# Patient Record
Sex: FEMALE | Race: WHITE | NOT HISPANIC OR LATINO | Employment: OTHER | URBAN - METROPOLITAN AREA
[De-identification: names, ages, dates, MRNs, and addresses within clinical notes are randomized per-mention and may not be internally consistent; named-entity substitution may affect disease eponyms.]

---

## 2017-06-12 ENCOUNTER — RECORDS - HEALTHEAST (OUTPATIENT)
Dept: LAB | Facility: CLINIC | Age: 77
End: 2017-06-12

## 2017-06-12 LAB
CHOLEST SERPL-MCNC: 147 MG/DL
FASTING STATUS PATIENT QL REPORTED: ABNORMAL
HDLC SERPL-MCNC: 39 MG/DL
LDLC SERPL CALC-MCNC: 72 MG/DL
TRIGL SERPL-MCNC: 178 MG/DL

## 2017-10-23 ENCOUNTER — RECORDS - HEALTHEAST (OUTPATIENT)
Dept: ADMINISTRATIVE | Facility: OTHER | Age: 77
End: 2017-10-23

## 2017-10-30 ENCOUNTER — RECORDS - HEALTHEAST (OUTPATIENT)
Dept: BONE DENSITY | Facility: CLINIC | Age: 77
End: 2017-10-30

## 2017-10-30 ENCOUNTER — RECORDS - HEALTHEAST (OUTPATIENT)
Dept: ADMINISTRATIVE | Facility: OTHER | Age: 77
End: 2017-10-30

## 2017-10-30 DIAGNOSIS — Z78.0 ASYMPTOMATIC MENOPAUSAL STATE: ICD-10-CM

## 2017-10-30 DIAGNOSIS — M85.80 OTHER SPECIFIED DISORDERS OF BONE DENSITY AND STRUCTURE, UNSPECIFIED SITE: ICD-10-CM

## 2018-06-11 ENCOUNTER — RECORDS - HEALTHEAST (OUTPATIENT)
Dept: LAB | Facility: CLINIC | Age: 78
End: 2018-06-11

## 2018-06-11 ENCOUNTER — RECORDS - HEALTHEAST (OUTPATIENT)
Dept: ADMINISTRATIVE | Facility: OTHER | Age: 78
End: 2018-06-11

## 2018-06-11 LAB
ANION GAP SERPL CALCULATED.3IONS-SCNC: 14 MMOL/L (ref 5–18)
BUN SERPL-MCNC: 18 MG/DL (ref 8–28)
CALCIUM SERPL-MCNC: 9.8 MG/DL (ref 8.5–10.5)
CHLORIDE BLD-SCNC: 103 MMOL/L (ref 98–107)
CHOLEST SERPL-MCNC: 156 MG/DL
CO2 SERPL-SCNC: 22 MMOL/L (ref 22–31)
CREAT SERPL-MCNC: 0.74 MG/DL (ref 0.6–1.1)
FASTING STATUS PATIENT QL REPORTED: YES
GFR SERPL CREATININE-BSD FRML MDRD: >60 ML/MIN/1.73M2
GLUCOSE BLD-MCNC: 88 MG/DL (ref 70–125)
HDLC SERPL-MCNC: 40 MG/DL
LDLC SERPL CALC-MCNC: 79 MG/DL
POTASSIUM BLD-SCNC: 4 MMOL/L (ref 3.5–5)
SODIUM SERPL-SCNC: 139 MMOL/L (ref 136–145)
TRIGL SERPL-MCNC: 184 MG/DL
VIT B12 SERPL-MCNC: 521 PG/ML (ref 213–816)

## 2018-06-12 LAB — 25(OH)D3 SERPL-MCNC: 30.4 NG/ML (ref 30–80)

## 2018-06-15 ENCOUNTER — RECORDS - HEALTHEAST (OUTPATIENT)
Dept: MAMMOGRAPHY | Facility: CLINIC | Age: 78
End: 2018-06-15

## 2018-06-15 DIAGNOSIS — Z12.31 ENCOUNTER FOR SCREENING MAMMOGRAM FOR MALIGNANT NEOPLASM OF BREAST: ICD-10-CM

## 2019-06-17 ENCOUNTER — RECORDS - HEALTHEAST (OUTPATIENT)
Dept: LAB | Facility: CLINIC | Age: 79
End: 2019-06-17

## 2019-06-17 LAB
ANION GAP SERPL CALCULATED.3IONS-SCNC: 12 MMOL/L (ref 5–18)
BUN SERPL-MCNC: 14 MG/DL (ref 8–28)
CALCIUM SERPL-MCNC: 10.1 MG/DL (ref 8.5–10.5)
CHLORIDE BLD-SCNC: 101 MMOL/L (ref 98–107)
CHOLEST SERPL-MCNC: 156 MG/DL
CO2 SERPL-SCNC: 25 MMOL/L (ref 22–31)
CREAT SERPL-MCNC: 0.78 MG/DL (ref 0.6–1.1)
FASTING STATUS PATIENT QL REPORTED: YES
GFR SERPL CREATININE-BSD FRML MDRD: >60 ML/MIN/1.73M2
GLUCOSE BLD-MCNC: 92 MG/DL (ref 70–125)
HDLC SERPL-MCNC: 42 MG/DL
LDLC SERPL CALC-MCNC: 65 MG/DL
POTASSIUM BLD-SCNC: 4.2 MMOL/L (ref 3.5–5)
SODIUM SERPL-SCNC: 138 MMOL/L (ref 136–145)
TRIGL SERPL-MCNC: 245 MG/DL
VIT B12 SERPL-MCNC: 490 PG/ML (ref 213–816)

## 2019-06-18 ENCOUNTER — AMBULATORY - HEALTHEAST (OUTPATIENT)
Dept: ADMINISTRATIVE | Facility: REHABILITATION | Age: 79
End: 2019-06-18

## 2019-06-18 DIAGNOSIS — R26.89 BALANCE PROBLEMS: ICD-10-CM

## 2019-07-03 ENCOUNTER — OFFICE VISIT - HEALTHEAST (OUTPATIENT)
Dept: PHYSICAL THERAPY | Facility: REHABILITATION | Age: 79
End: 2019-07-03

## 2019-07-03 DIAGNOSIS — R26.81 UNSTEADINESS ON FEET: ICD-10-CM

## 2019-07-09 ENCOUNTER — OFFICE VISIT - HEALTHEAST (OUTPATIENT)
Dept: PHYSICAL THERAPY | Facility: REHABILITATION | Age: 79
End: 2019-07-09

## 2019-07-09 DIAGNOSIS — R26.81 UNSTEADINESS ON FEET: ICD-10-CM

## 2019-07-11 ENCOUNTER — OFFICE VISIT - HEALTHEAST (OUTPATIENT)
Dept: PHYSICAL THERAPY | Facility: REHABILITATION | Age: 79
End: 2019-07-11

## 2019-07-11 DIAGNOSIS — R26.81 UNSTEADINESS ON FEET: ICD-10-CM

## 2019-07-16 ENCOUNTER — OFFICE VISIT - HEALTHEAST (OUTPATIENT)
Dept: PHYSICAL THERAPY | Facility: REHABILITATION | Age: 79
End: 2019-07-16

## 2019-07-16 DIAGNOSIS — R26.81 UNSTEADINESS ON FEET: ICD-10-CM

## 2019-07-18 ENCOUNTER — OFFICE VISIT - HEALTHEAST (OUTPATIENT)
Dept: PHYSICAL THERAPY | Facility: REHABILITATION | Age: 79
End: 2019-07-18

## 2019-07-18 DIAGNOSIS — R26.81 UNSTEADINESS ON FEET: ICD-10-CM

## 2019-07-23 ENCOUNTER — OFFICE VISIT - HEALTHEAST (OUTPATIENT)
Dept: PHYSICAL THERAPY | Facility: REHABILITATION | Age: 79
End: 2019-07-23

## 2019-07-23 DIAGNOSIS — R26.81 UNSTEADINESS ON FEET: ICD-10-CM

## 2019-07-25 ENCOUNTER — OFFICE VISIT - HEALTHEAST (OUTPATIENT)
Dept: PHYSICAL THERAPY | Facility: REHABILITATION | Age: 79
End: 2019-07-25

## 2019-07-25 DIAGNOSIS — R26.81 UNSTEADINESS ON FEET: ICD-10-CM

## 2019-07-30 ENCOUNTER — OFFICE VISIT - HEALTHEAST (OUTPATIENT)
Dept: PHYSICAL THERAPY | Facility: REHABILITATION | Age: 79
End: 2019-07-30

## 2019-07-30 DIAGNOSIS — R26.81 UNSTEADINESS ON FEET: ICD-10-CM

## 2019-08-01 ENCOUNTER — OFFICE VISIT - HEALTHEAST (OUTPATIENT)
Dept: PHYSICAL THERAPY | Facility: REHABILITATION | Age: 79
End: 2019-08-01

## 2019-08-01 DIAGNOSIS — R26.81 UNSTEADINESS ON FEET: ICD-10-CM

## 2019-08-06 ENCOUNTER — OFFICE VISIT - HEALTHEAST (OUTPATIENT)
Dept: PHYSICAL THERAPY | Facility: REHABILITATION | Age: 79
End: 2019-08-06

## 2019-08-06 DIAGNOSIS — R26.81 UNSTEADINESS ON FEET: ICD-10-CM

## 2019-08-08 ENCOUNTER — OFFICE VISIT - HEALTHEAST (OUTPATIENT)
Dept: PHYSICAL THERAPY | Facility: REHABILITATION | Age: 79
End: 2019-08-08

## 2019-08-08 DIAGNOSIS — R26.81 UNSTEADINESS ON FEET: ICD-10-CM

## 2019-08-13 ENCOUNTER — OFFICE VISIT - HEALTHEAST (OUTPATIENT)
Dept: PHYSICAL THERAPY | Facility: REHABILITATION | Age: 79
End: 2019-08-13

## 2019-08-13 DIAGNOSIS — R26.81 UNSTEADINESS ON FEET: ICD-10-CM

## 2020-09-17 ENCOUNTER — RECORDS - HEALTHEAST (OUTPATIENT)
Dept: LAB | Facility: CLINIC | Age: 80
End: 2020-09-17

## 2020-09-17 LAB
ANION GAP SERPL CALCULATED.3IONS-SCNC: 11 MMOL/L (ref 5–18)
BUN SERPL-MCNC: 14 MG/DL (ref 8–28)
CALCIUM SERPL-MCNC: 9.5 MG/DL (ref 8.5–10.5)
CHLORIDE BLD-SCNC: 100 MMOL/L (ref 98–107)
CHOLEST SERPL-MCNC: 169 MG/DL
CO2 SERPL-SCNC: 26 MMOL/L (ref 22–31)
CREAT SERPL-MCNC: 0.84 MG/DL (ref 0.6–1.1)
FASTING STATUS PATIENT QL REPORTED: YES
GFR SERPL CREATININE-BSD FRML MDRD: >60 ML/MIN/1.73M2
GLUCOSE BLD-MCNC: 96 MG/DL (ref 70–125)
HDLC SERPL-MCNC: 42 MG/DL
LDLC SERPL CALC-MCNC: 74 MG/DL
POTASSIUM BLD-SCNC: 4.5 MMOL/L (ref 3.5–5)
SODIUM SERPL-SCNC: 137 MMOL/L (ref 136–145)
TRIGL SERPL-MCNC: 265 MG/DL

## 2020-11-16 ENCOUNTER — RECORDS - HEALTHEAST (OUTPATIENT)
Dept: LAB | Facility: CLINIC | Age: 80
End: 2020-11-16

## 2020-11-16 LAB
ALBUMIN SERPL-MCNC: 3.8 G/DL (ref 3.5–5)
ALP SERPL-CCNC: 66 U/L (ref 45–120)
ALT SERPL W P-5'-P-CCNC: 19 U/L (ref 0–45)
ANION GAP SERPL CALCULATED.3IONS-SCNC: 10 MMOL/L (ref 5–18)
AST SERPL W P-5'-P-CCNC: 21 U/L (ref 0–40)
BILIRUB SERPL-MCNC: 0.4 MG/DL (ref 0–1)
BUN SERPL-MCNC: 21 MG/DL (ref 8–28)
CALCIUM SERPL-MCNC: 9.7 MG/DL (ref 8.5–10.5)
CHLORIDE BLD-SCNC: 99 MMOL/L (ref 98–107)
CO2 SERPL-SCNC: 30 MMOL/L (ref 22–31)
CREAT SERPL-MCNC: 0.96 MG/DL (ref 0.6–1.1)
GFR SERPL CREATININE-BSD FRML MDRD: 56 ML/MIN/1.73M2
GLUCOSE BLD-MCNC: 103 MG/DL (ref 70–125)
POTASSIUM BLD-SCNC: 4.2 MMOL/L (ref 3.5–5)
PROT SERPL-MCNC: 7.3 G/DL (ref 6–8)
SODIUM SERPL-SCNC: 139 MMOL/L (ref 136–145)
TSH SERPL DL<=0.005 MIU/L-ACNC: 1.48 UIU/ML (ref 0.3–5)

## 2021-01-22 ENCOUNTER — RECORDS - HEALTHEAST (OUTPATIENT)
Dept: ADMINISTRATIVE | Facility: OTHER | Age: 81
End: 2021-01-22

## 2021-01-22 ENCOUNTER — RECORDS - HEALTHEAST (OUTPATIENT)
Dept: LAB | Facility: CLINIC | Age: 81
End: 2021-01-22

## 2021-01-22 LAB — ERYTHROCYTE [SEDIMENTATION RATE] IN BLOOD BY WESTERGREN METHOD: 26 MM/HR (ref 0–20)

## 2021-01-23 LAB
BASOPHILS # BLD AUTO: 0.1 THOU/UL (ref 0–0.2)
BASOPHILS NFR BLD AUTO: 1 % (ref 0–2)
EOSINOPHIL # BLD AUTO: 0.1 THOU/UL (ref 0–0.4)
EOSINOPHIL NFR BLD AUTO: 1 % (ref 0–6)
ERYTHROCYTE [DISTWIDTH] IN BLOOD BY AUTOMATED COUNT: 13.4 % (ref 11–14.5)
HCT VFR BLD AUTO: 36.6 % (ref 35–47)
HGB BLD-MCNC: 11.9 G/DL (ref 12–16)
IMM GRANULOCYTES # BLD: 0 THOU/UL
IMM GRANULOCYTES NFR BLD: 0 %
LYMPHOCYTES # BLD AUTO: 3.4 THOU/UL (ref 0.8–4.4)
LYMPHOCYTES NFR BLD AUTO: 27 % (ref 20–40)
MCH RBC QN AUTO: 28.8 PG (ref 27–34)
MCHC RBC AUTO-ENTMCNC: 32.5 G/DL (ref 32–36)
MCV RBC AUTO: 89 FL (ref 80–100)
MONOCYTES # BLD AUTO: 0.8 THOU/UL (ref 0–0.9)
MONOCYTES NFR BLD AUTO: 6 % (ref 2–10)
NEUTROPHILS # BLD AUTO: 8.3 THOU/UL (ref 2–7.7)
NEUTROPHILS NFR BLD AUTO: 66 % (ref 50–70)
PATH REPORT.MICROSCOPIC SPEC OTHER STN: ABNORMAL
PLATELET # BLD AUTO: 331 THOU/UL (ref 140–440)
PMV BLD AUTO: 9.9 FL (ref 8.5–12.5)
RBC # BLD AUTO: 4.13 MILL/UL (ref 3.8–5.4)
WBC: 12.6 THOU/UL (ref 4–11)

## 2021-01-25 LAB
ALBUMIN PERCENT: 57.8 % (ref 51–67)
ALBUMIN SERPL ELPH-MCNC: 4.1 G/DL (ref 3.2–4.7)
ALPHA 1 PERCENT: 3.1 % (ref 2–4)
ALPHA 2 PERCENT: 12.1 % (ref 5–13)
ALPHA1 GLOB SERPL ELPH-MCNC: 0.2 G/DL (ref 0.1–0.3)
ALPHA2 GLOB SERPL ELPH-MCNC: 0.9 G/DL (ref 0.4–0.9)
B-GLOBULIN SERPL ELPH-MCNC: 0.9 G/DL (ref 0.7–1.2)
BETA PERCENT: 13.1 % (ref 10–17)
GAMMA GLOB SERPL ELPH-MCNC: 1 G/DL (ref 0.6–1.4)
GAMMA GLOBULIN PERCENT: 13.9 % (ref 9–20)
LAB AP CHARGES (HE HISTORICAL CONVERSION): NORMAL
PATH ICD:: NORMAL
PATH REPORT.COMMENTS IMP SPEC: NORMAL
PATH REPORT.COMMENTS IMP SPEC: NORMAL
PATH REPORT.FINAL DX SPEC: NORMAL
PATH REPORT.MICROSCOPIC SPEC OTHER STN: NORMAL
PATH REPORT.RELEVANT HX SPEC: NORMAL
PROT PATTERN SERPL ELPH-IMP: NORMAL
PROT SERPL-MCNC: 7.1 G/DL (ref 6–8)
REVIEWING PATHOLOGIST: NORMAL

## 2021-01-29 ENCOUNTER — RECORDS - HEALTHEAST (OUTPATIENT)
Dept: LAB | Facility: CLINIC | Age: 81
End: 2021-01-29

## 2021-01-29 LAB
SARS-COV-2 PCR COMMENT: NORMAL
SARS-COV-2 RNA SPEC QL NAA+PROBE: NEGATIVE
SARS-COV-2 VIRUS SPECIMEN SOURCE: NORMAL

## 2021-05-25 ENCOUNTER — RECORDS - HEALTHEAST (OUTPATIENT)
Dept: ADMINISTRATIVE | Facility: CLINIC | Age: 81
End: 2021-05-25

## 2021-05-30 ENCOUNTER — RECORDS - HEALTHEAST (OUTPATIENT)
Dept: ADMINISTRATIVE | Facility: CLINIC | Age: 81
End: 2021-05-30

## 2021-05-30 NOTE — PROGRESS NOTES
[facility] Daily Progress     Patient Name: Gilma Zhong  Date: 7/16/2019  Visit #: 4/12 through 10/1  PTA visit #:    PRECAUTIONS: none  Referral Diagnosis: Balance problem (12 visits max)  Referring provider: Lily Thomas PA-C  Visit Diagnosis:     ICD-10-CM    1. Unsteadiness on feet R26.81          Assessment:     Although patient demonstrates relatively low risk for falls, she is appropriate to continue with skilled PT to reduce visual dependence/improve LE proprioception to help further reduce falls risk and improve ambulation tolerance.  HEP/POC compliance is  good .  Patient is appropriate to continue with skilled physical therapy intervention, as indicated by initial plan of care.   Pt with good tolerance for today's session. Requires several short seated rest breaks due to LE fatigue, but otherwise notes good level of difficulty with all balance ex.    Goal Status:  Pt. will be independent with home exercise program in : 6 weeks    Patient will increase : LEFS score;for improved quality of function;in 6 weeks;by _ points  by ___ points: >= 9  Pt will: demonstrate increased ambulation tolerance to >= 5 blocks without seated rest in 6 weeks.  Pt will: demonstrate improved balance: RODRIGUEZ >= 53/56 in 6 weeks.      Plan / Patient Education:     Continue per POC, progressing LE strength and static/dynamic standing balance.  Add SLS to HEP as able.    Subjective:     Feeling like she can stand/walk taller and even a little bit further already.  No falls since last visit.    Objective:     Varying levels of SBA/CGA required for static and dynamic standing balance ex, with occasional min A from therapist to maintain upright.  Attempted SLS, but patient with fairly significant difficulty, L LE more so than R.    Treatment Today     TREATMENT MINUTES COMMENTS   Evaluation     Self-care/ Home management     Manual therapy     Neuromuscular Re-education 39 Balance ex per flow sheet   Therapeutic  Activity     Therapeutic Exercises     Gait training     Modality__________________                Total 39    Blank areas are intentional and mean the treatment did not include these items.       Yariel Mello, PT, DPT  7/16/2019

## 2021-05-30 NOTE — PROGRESS NOTES
[facility] Daily Progress     Patient Name: Gilma Zhong  Date: 7/11/2019  Visit #: 312 through 10/1  PTA visit #:    PRECAUTIONS: none  Referral Diagnosis: Balance problem (12 visits max)  Referring provider: Lily Thomas PA-C  Visit Diagnosis:     ICD-10-CM    1. Unsteadiness on feet R26.81          Assessment:     Although patient demonstrates relatively low risk for falls, she is appropriate to continue with skilled PT to reduce visual dependence/improve LE proprioception to help further reduce falls risk and improve ambulation tolerance.  HEP/POC compliance is  good .  Patient is appropriate to continue with skilled physical therapy intervention, as indicated by initial plan of care.   Pt with good tolerance for today's session. Requires several short seated rest breaks due to LE fatigue, but otherwise notes good level of difficulty with all balance ex.    Goal Status:  Pt. will be independent with home exercise program in : 6 weeks    Patient will increase : LEFS score;for improved quality of function;in 6 weeks;by _ points  by ___ points: >= 9  Pt will: demonstrate increased ambulation tolerance to >= 5 blocks without seated rest in 6 weeks.  Pt will: demonstrate improved balance: RODRIGUEZ >= 53/56 in 6 weeks.      Plan / Patient Education:     Continue per POC, progressing LE strength and static/dynamic standing balance.    Subjective:     Feeling like she can stand/walk taller and even a little bit further already.  No falls since last visit.    Objective:     Able to progress 3/4 tandem to tandem stance today.  Varying levels of SBA/CGA required for static and dynamic standing balance ex, with occasional min A from therapist to maintain upright.    Treatment Today     TREATMENT MINUTES COMMENTS   Evaluation     Self-care/ Home management     Manual therapy     Neuromuscular Re-education 56 Balance ex per flow sheet   Therapeutic Activity     Therapeutic Exercises     Gait training      Modality__________________                Total 56    Blank areas are intentional and mean the treatment did not include these items.       Yariel Mello, PT, DPT  7/11/2019

## 2021-05-30 NOTE — PROGRESS NOTES
[facility] Daily Progress     Patient Name: Gilma Zhong  Date: 2019  Visit #:  through 10/1  PTA visit #:    PRECAUTIONS: none  Referral Diagnosis: Balance problem (12 visits max)  Referring provider: Lily Thomas PA-C  Visit Diagnosis:     ICD-10-CM    1. Unsteadiness on feet R26.81          Assessment:     Although patient demonstrates relatively low risk for falls, she is appropriate to continue with skilled PT to reduce visual dependence/improve LE proprioception to help further reduce falls risk and improve ambulation tolerance.  HEP/POC compliance is  good .  Patient is benefitting from skilled physical therapy and is making steady progress toward functional goals.  Patient is appropriate to continue with skilled physical therapy intervention, as indicated by initial plan of care.     Goal Status:  Pt. will be independent with home exercise program in : 6 weeks. MET  Patient will increase : LEFS score;for improved quality of function;in 6 weeks;by _ points  by ___ points: >= 9. MET  Pt will: demonstrate increased ambulation tolerance to >= 5 blocks without seated rest in 6 weeks. PROGRESSING  Pt will: demonstrate improved balance: RODRIGUEZ >= 53/56 in 6 weeks. MET      Plan / Patient Education:     Continue per POC, progressing LE strength and static/dynamic standing balance.  Add wall squats to HEP next visit.    Subjective:     No falls.  Continuing to do exercises regularly.  Notes her walking to be continuously getting better. Able to stand more upright and walk a straighter path. Able to tolerate approx. 3-4 blocks of walking currently.    Objective:     LEFS: 64/80 - significantly improved from 28/80 on IE.  RODRIGUEZ/56 - improved from 50/56 on IE.    Treatment Today     TREATMENT MINUTES COMMENTS   Evaluation     Self-care/ Home management     Manual therapy     Neuromuscular Re-education 23 Objective measures   Therapeutic Activity     Therapeutic Exercises 5 Stationary bike  level 4x 5 min   Gait training     Modality__________________                Total 28    Blank areas are intentional and mean the treatment did not include these items.       Yariel Mello, PT, DPT  7/30/2019

## 2021-05-30 NOTE — PROGRESS NOTES
[facility] Daily Progress     Patient Name: Gilma Zhong  Date: 7/23/2019  Visit #: 6/12 through 10/1  PTA visit #:    PRECAUTIONS: none  Referral Diagnosis: Balance problem (12 visits max)  Referring provider: Lily Thomas PA-C  Visit Diagnosis:     ICD-10-CM    1. Unsteadiness on feet R26.81          Assessment:     Although patient demonstrates relatively low risk for falls, she is appropriate to continue with skilled PT to reduce visual dependence/improve LE proprioception to help further reduce falls risk and improve ambulation tolerance.  HEP/POC compliance is  good .  Patient is benefitting from skilled physical therapy and is making steady progress toward functional goals.  Patient is appropriate to continue with skilled physical therapy intervention, as indicated by initial plan of care.     Goal Status:  Pt. will be independent with home exercise program in : 6 weeks    Patient will increase : LEFS score;for improved quality of function;in 6 weeks;by _ points  by ___ points: >= 9  Pt will: demonstrate increased ambulation tolerance to >= 5 blocks without seated rest in 6 weeks.  Pt will: demonstrate improved balance: RODRIGUEZ >= 53/56 in 6 weeks.      Plan / Patient Education:     Continue per POC, progressing LE strength and static/dynamic standing balance.  Reassess goals in 2 visits.    Subjective:     No falls.  Continuing to do exercises regularly.    Objective:     Pt able to demonstrate SLS approx. 2-3 sec bilat on R, x5 sec on L LE.  Cues provided throughout session for upright posture, as patient frequently forward bent at the hips.    Treatment Today     TREATMENT MINUTES COMMENTS   Evaluation     Self-care/ Home management     Manual therapy     Neuromuscular Re-education 29 Balance ex per flow sheet   Therapeutic Activity     Therapeutic Exercises     Gait training     Modality__________________                Total 29    Blank areas are intentional and mean the treatment did not  include these items.       Yariel Mello, PT, DPT  7/23/2019

## 2021-05-30 NOTE — PROGRESS NOTES
[facility] Daily Progress     Patient Name: Gilma Zhong  Date: 7/18/2019  Visit #: 5/12 through 10/1  PTA visit #:    PRECAUTIONS: none  Referral Diagnosis: Balance problem (12 visits max)  Referring provider: Lily Thomas PA-C  Visit Diagnosis:     ICD-10-CM    1. Unsteadiness on feet R26.81          Assessment:     Although patient demonstrates relatively low risk for falls, she is appropriate to continue with skilled PT to reduce visual dependence/improve LE proprioception to help further reduce falls risk and improve ambulation tolerance.  HEP/POC compliance is  good .  Patient is benefitting from skilled physical therapy and is making steady progress toward functional goals.  Patient is appropriate to continue with skilled physical therapy intervention, as indicated by initial plan of care.     Goal Status:  Pt. will be independent with home exercise program in : 6 weeks    Patient will increase : LEFS score;for improved quality of function;in 6 weeks;by _ points  by ___ points: >= 9  Pt will: demonstrate increased ambulation tolerance to >= 5 blocks without seated rest in 6 weeks.  Pt will: demonstrate improved balance: RODRIGUEZ >= 53/56 in 6 weeks.      Plan / Patient Education:     Continue per POC, progressing LE strength and static/dynamic standing balance.  Add SLS to HEP as able.    Subjective:     Feeling like she is continuing to get much stronger. After last visit, went home and did more exercises, and felt really good. Was able to mow the lawn yesterday just fine, and noticed greater ease with walking on the uneven grass, whereas before she notes to be quite wobbly.  She feels like the bike is really helpful for her back.    Objective:     Attempted SLS, but patient with continued difficulty. Able to demonstrate approx. 2-3 sec bilat.  Cues provided throughout session for upright posture, as patient frequently forward bent at the hips.    Treatment Today     TREATMENT MINUTES COMMENTS    Evaluation     Self-care/ Home management     Manual therapy     Neuromuscular Re-education 28 Balance ex per flow sheet   Therapeutic Activity     Therapeutic Exercises     Gait training     Modality__________________                Total 28    Blank areas are intentional and mean the treatment did not include these items.       Yariel Mello, PT, DPT  7/18/2019

## 2021-05-30 NOTE — PROGRESS NOTES
July 3, 2019      Patient: Gilma Zhong   MR Number: [id number]   YOB: 1940   Date of Visit: 7/3/2019       Dear Dr. Lily Thomas:    Thank you for this referral.   We are seeing Gilma Zhong in Physical Therapy for decreased balance.    Medicare and/or Medicaid requires physician review and approval of the treatment plan. Please review the plan of care and verify that you agree with the therapy plan of care by co-signing this note.      Plan of Care  Authorization / Certification Start Date: 07/03/19  Authorization / Certification End Date: 10/01/19  Authorization / Certification Number of Visits: 12  Communication with: Referral Source  Patient Related Instruction: Nature of Condition;Treatment plan and rationale;Self Care instruction;Basis of treatment;Body mechanics;Posture;Precautions;Next steps;Expected outcome  Times per Week: 2  Number of Weeks: 4-6  Number of Visits: 12  Therapeutic Exercise: Stretching;Strengthening  Neuromuscular Reeducation: balance/proprioception      Goals  Pt. will be independent with home exercise program in : 6 weeks    Patient will increase : LEFS score;for improved quality of function;in 6 weeks;by _ points  by ___ points: >= 9  Pt will: demonstrate increased ambulation tolerance to >= 5 blocks without seated rest in 6 weeks.  Pt will: demonstrate improved balance: RODRIGUEZ >= 53/56 in 6 weeks.          If you have any questions or concerns, please don't hesitate to call.    Sincerely,    Yariel Mello, PT      Physician recommendation:     ___ Follow therapist's recommendation        ___ Modify therapy      I certify the need for these services furnished within this plan and while under my care.    Referring Provider's  Printed Name:   _____________________________________________    Referring Provider's signature:  __________________________________________________  Date/time:    ________________        After signing this form, please return to the fax number  in the header.  Thank you.    [facility]   Initial Evaluation    Patient Name: Gilma Zhong  Date of evaluation: 7/3/2019  PRECAUTIONS: none  Referral Diagnosis: Balance problem (12 visits max)  Referring provider: Lily Thomas PA-C  Visit Diagnosis:     ICD-10-CM    1. Unsteadiness on feet R26.81        Assessment:      Pt. is appropriate for skilled PT intervention as outlined in the Plan of Care (POC).  Pt. is a good candidate for skilled PT services to improve pain levels and function.  Goals and POC established in collaboration with the patient.  Although patient demonstrates relatively low risk for falls, she is appropriate to continue with skilled PT to reduce visual dependence/improve LE proprioception to help further reduce falls risk and improve ambulation tolerance.    Goals:  Pt. will be independent with home exercise program in : 6 weeks    Patient will increase : LEFS score;for improved quality of function;in 6 weeks;by _ points  by ___ points: >= 9  Pt will: demonstrate increased ambulation tolerance to >= 5 blocks without seated rest in 6 weeks.  Pt will: demonstrate improved balance: RODRIGUEZ >= 53/56 in 6 weeks.      Patient's expectations/goals are realistic.    Barriers to Learning or Achieving Goals:  Age.        Plan / Patient Instructions:        Plan of Care:   Authorization / Certification Start Date: 07/03/19  Authorization / Certification End Date: 10/01/19  Authorization / Certification Number of Visits: 12  Communication with: Referral Source  Patient Related Instruction: Nature of Condition;Treatment plan and rationale;Self Care instruction;Basis of treatment;Body mechanics;Posture;Precautions;Next steps;Expected outcome  Times per Week: 2  Number of Weeks: 4-6  Number of Visits: 12  Therapeutic Exercise: Stretching;Strengthening  Neuromuscular Reeducation: balance/proprioception      Plan for next visit: Review HEP. Continue to progress static and dynamic LE strength and  "balance training, incorporating core/gluteal strength as able.     Subjective:       History of Present Illness:    Gilma is a 79 y.o. female who presents to therapy today with complaints of decreased balance.  Pt uses no AD at baseline.  Pt lives in a one-story house alone, with several steps to enter with a handrail, with stairs to the basement to the laundry.  Pt reports 0 falls within the last year.     Pt seeks PT to \"balance and walking.\"  \"I would like to be able to walk 5 blocks.\"  She notes part of her limitation to be low back discomfort, better with Tylenol or use of shopping cart.  Denies N/T in legs.     Objective:      Patient Outcome Measures :    Lower Extremity Functional Scale (_/80): 28     Scores range from 0-80, where a score of 80 represents maximum function. The minimal clinically important difference is a positive change of 9 points.    Examination  1. Unsteadiness on feet       Involved side: Bilateral  Posture Observation:      General sitting posture is  fair.  General standing posture is poor.    Gait: Significantly forward flexed at the hips posture, partially correctable with cueing.    LE sensation intact to light tough t/o.  LE strength grossly 4+/5 t/o.  LE coordination WFL as assessed through alternate toe tapping    TU.75 sec without AD.  APTA: 13x without UE A.    Balance Assessment:  Rhomberg - Eyes Open:  30 seconds  Rhomberg - Eyes Closed:  30 seconds  Rhomberg - Perturbed Surface with Eyes Open:  20 seconds  Rhomberg - Perturbed Surface with Eyes Closed:  2 seconds  Sharpened Rhomberg - Eyes Open:  R in front 2 seconds; L in front 1 seconds  Single Leg Stance:  0 seconds      RODRIGUEZ/56    Treatment Today     TREATMENT MINUTES COMMENTS   Evaluation 20    Self-care/ Home management     Manual therapy     Neuromuscular Re-education 25 -Discussed therapy diagnosis, prognosis, POC, relevant neuroanatomy and basis for tx.  -Reviewed and performed HEP with handouts provided. "   Therapeutic Activity     Therapeutic Exercises     Gait training     Modality__________________                Total 45    Blank areas are intentional and mean the treatment did not include these items.            PT Evaluation Code: (Please list factors)  Patient History/Comorbidities: age  Examination: Decreased balance  Clinical Presentation: Stable  Clinical Decision Making: Low    Patient History/  Comorbidities Examination  (body structures and functions, activity limitations, and/or participation restrictions) Clinical Presentation Clinical Decision Making (Complexity)   No documented Comorbidities or personal factors 1-2 Elements Stable and/or uncomplicated Low   1-2 documented comorbidities or personal factor 3 Elements Evolving clinical presentation with changing characteristics Moderate   3-4 documented comorbidities or personal factors 4 or more Unstable and unpredictable High           Yariel Mello, PT, DPT  7/3/2019  8:52 AM

## 2021-05-30 NOTE — PROGRESS NOTES
[facility] Daily Progress     Patient Name: Gilma Zhong  Date: 7/25/2019  Visit #: 7/12 through 10/1  PTA visit #:    PRECAUTIONS: none  Referral Diagnosis: Balance problem (12 visits max)  Referring provider: Lily Thomas PA-C  Visit Diagnosis:     ICD-10-CM    1. Unsteadiness on feet R26.81          Assessment:     Although patient demonstrates relatively low risk for falls, she is appropriate to continue with skilled PT to reduce visual dependence/improve LE proprioception to help further reduce falls risk and improve ambulation tolerance.  HEP/POC compliance is  good .  Patient is benefitting from skilled physical therapy and is making steady progress toward functional goals.  Patient is appropriate to continue with skilled physical therapy intervention, as indicated by initial plan of care.     Goal Status:  Pt. will be independent with home exercise program in : 6 weeks    Patient will increase : LEFS score;for improved quality of function;in 6 weeks;by _ points  by ___ points: >= 9  Pt will: demonstrate increased ambulation tolerance to >= 5 blocks without seated rest in 6 weeks.  Pt will: demonstrate improved balance: RODRIGUEZ >= 53/56 in 6 weeks.      Plan / Patient Education:     Continue per POC, progressing LE strength and static/dynamic standing balance.  Reassess goals next visit.    Subjective:     No falls.  Continuing to do exercises regularly.  Notes her walking to be continuously getting better. Able to stand more upright and walk a straighter path.    Objective:     Pt able to demonstrate SLS approx. 2-3 sec bilat on L, x5 sec on R LE.  CGA provided for dynamic standing balance ex for safety.    Treatment Today     TREATMENT MINUTES COMMENTS   Evaluation     Self-care/ Home management     Manual therapy     Neuromuscular Re-education 28 Balance ex per flow sheet   Therapeutic Activity     Therapeutic Exercises     Gait training     Modality__________________                Total 28     Blank areas are intentional and mean the treatment did not include these items.       Yariel Mello, PT, DPT  7/25/2019

## 2021-05-30 NOTE — PROGRESS NOTES
[facility] Daily Progress     Patient Name: Gilma Zhong  Date: 7/9/2019  Visit #: 2/12 through 10/1  PTA visit #:    PRECAUTIONS: none  Referral Diagnosis: Balance problem (12 visits max)  Referring provider: Lily Thomas PA-C  Visit Diagnosis:     ICD-10-CM    1. Unsteadiness on feet R26.81          Assessment:     Although patient demonstrates relatively low risk for falls, she is appropriate to continue with skilled PT to reduce visual dependence/improve LE proprioception to help further reduce falls risk and improve ambulation tolerance.  HEP/POC compliance is  good .  Patient is appropriate to continue with skilled physical therapy intervention, as indicated by initial plan of care.   Pt notes mild fatigue with today's session, but no pain. Feels to be walking more upright and able to go further before fatiguing.    Goal Status:  Pt. will be independent with home exercise program in : 6 weeks    Patient will increase : LEFS score;for improved quality of function;in 6 weeks;by _ points  by ___ points: >= 9  Pt will: demonstrate increased ambulation tolerance to >= 5 blocks without seated rest in 6 weeks.  Pt will: demonstrate improved balance: RODRIGUEZ >= 53/56 in 6 weeks.      Plan / Patient Education:     Continue per POC, progressing LE strength and static/dynamic standing balance.    Subjective:     Reports to be feeling well today. Was able to walk further from the parking lot and it felt easier and able to do more upright.  Doing HEP regularly.   Denies falls.    Objective:     Reviewed HEP with moderate verbal, tactile, and demonstrative cueing for correct performance.  Attempted SLS, but patient with significant difficulty with R foot forward.  Added FT/EO, EC as able on pillow to HEP.    Treatment Today     TREATMENT MINUTES COMMENTS   Evaluation     Self-care/ Home management     Manual therapy     Neuromuscular Re-education 26 Balance ex per flow sheet   Therapeutic Activity      Therapeutic Exercises     Gait training     Modality__________________                Total 26    Blank areas are intentional and mean the treatment did not include these items.       Yariel Mello, PT, DPT  7/9/2019

## 2021-05-31 ENCOUNTER — RECORDS - HEALTHEAST (OUTPATIENT)
Dept: ADMINISTRATIVE | Facility: CLINIC | Age: 81
End: 2021-05-31

## 2021-05-31 NOTE — PROGRESS NOTES
[facility] Daily Progress     Patient Name: Gilma Zhong  Date: 2019  Visit #: 10/12 through 10/1  PTA visit #:    PRECAUTIONS: none  Referral Diagnosis: Balance problem (12 visits max)  Referring provider: Lily Thomas PA-C  Visit Diagnosis:     ICD-10-CM    1. Unsteadiness on feet R26.81          Assessment:     Although patient demonstrates relatively low risk for falls, she is appropriate to continue with skilled PT to reduce visual dependence/improve LE proprioception to help further reduce falls risk and improve ambulation tolerance.  HEP/POC compliance is  good .  Patient is benefitting from skilled physical therapy and is making steady progress toward functional goals.  Patient is appropriate to continue with skilled physical therapy intervention, as indicated by initial plan of care.   Goals NEARING.    Goal Status:  Pt. will be independent with home exercise program in : 6 weeks. MET  Patient will increase : LEFS score;for improved quality of function;in 6 weeks;by _ points  by ___ points: >= 9. MET  Pt will: demonstrate increased ambulation tolerance to >= 5 blocks without seated rest in 6 weeks. PROGRESSING  Pt will: demonstrate improved balance: RODRIGUEZ >= 53/56 in 6 weeks. MET      Plan / Patient Education:     Continue per POC, progressing LE strength and static/dynamic standing balance.    Subjective:     No falls.  Has been practicing lifting the heel of one foot at a time to help improve SLS.    Objective:     Pt able to demonstrate SLS upwards of 3 sec on L LE, 10 sec on R LE today.     From  visit:  LEFS: 64/80 - significantly improved from 28/80 on IE.  RODRIGUEZ/56 - improved from 50/56 on IE.    Treatment Today     TREATMENT MINUTES COMMENTS   Evaluation     Self-care/ Home management     Manual therapy     Neuromuscular Re-education 32 Balance ex per flow sheet   Therapeutic Activity     Therapeutic Exercises     Gait training     Modality__________________                 Total 32    Blank areas are intentional and mean the treatment did not include these items.       Yariel Mello, PT, DPT  8/6/2019

## 2021-05-31 NOTE — PROGRESS NOTES
[facility] Daily Progress     Patient Name: Gilma Zhong  Date: 2019  Visit #:  through 10/1  PTA visit #:    PRECAUTIONS: none  Referral Diagnosis: Balance problem (12 visits max)  Referring provider: Lily Thomas PA-C  Visit Diagnosis:     ICD-10-CM    1. Unsteadiness on feet R26.81          Assessment:     Overall, patient demonstrates significant improvement in LE strength and balance from start of care, demonstrating relatively low risk for falls, based on below testing. Plan to discharge to [facility] next session.    Goal Status:  Pt. will be independent with home exercise program in : 6 weeks. MET  Patient will increase : LEFS score;for improved quality of function;in 6 weeks;by _ points  by ___ points: >= 9. MET  Pt will: demonstrate increased ambulation tolerance to >= 5 blocks without seated rest in 6 weeks. PROGRESSING  Pt will: demonstrate improved balance: RODRIGUEZ >= 53/56 in 6 weeks. MET      Plan / Patient Education:     Discharge to [facility] next session.    Subjective:     No falls.  Overall, feeling is a lot more steady. Able to walk a much straighter path at home and out in the community. She notes she is able to walk further and more upright compared to before. No longer needing to stop and take as many rests. Notes shortness of breath with walking to be much better.  She is unsure exactly how far she can walk, but would guess at least 3-4 blocks at this point, which is improved from before.    Objective:     TU.5 sec without AD  APTA: 13x without UE A  4m walk test: 2.8 sec without AD  LEFS: 64/80 - significantly improved from 28/80 on IE.  RODRIGUEZ/56 - improved from 50/56 on IE.    Balance Assessment:  Rhomberg - Eyes Open:  30 seconds  Rhomberg - Eyes Closed:  30 seconds  Rhomberg - Perturbed Surface with Eyes Open:  30 seconds  Rhomberg - Perturbed Surface with Eyes Closed:  30 seconds  Sharpened Rhomberg - Eyes Open:  R in front 30 seconds; L in front 30 seconds  Single Leg  Stance:  10 seconds on R LE, up to 7 sec on L LE    Treatment Today     TREATMENT MINUTES COMMENTS   Evaluation     Self-care/ Home management     Manual therapy     Neuromuscular Re-education 32 Balance ex per flow sheet   Therapeutic Activity     Therapeutic Exercises     Gait training     Modality__________________     Physical Performance Testing 24          Total 56    Blank areas are intentional and mean the treatment did not include these items.       Yariel Mello, PT, DPT  8/8/2019

## 2021-05-31 NOTE — PROGRESS NOTES
[facility] Daily Progress     Patient Name: Gilma Zhong  Date: 2019  Visit #:  through 10/1  PTA visit #:    PRECAUTIONS: none  Referral Diagnosis: Balance problem (12 visits max)  Referring provider: Lily Thomas PA-C  Visit Diagnosis:     ICD-10-CM    1. Unsteadiness on feet R26.81          Assessment:     Overall, patient demonstrates significant improvement in LE strength and balance from start of care, demonstrating relatively low risk for falls, based on below testing. Plan to discharge to [facility].    Goal Status:  Pt. will be independent with home exercise program in : 6 weeks. MET  Patient will increase : LEFS score;for improved quality of function;in 6 weeks;by _ points  by ___ points: >= 9. MET  Pt will: demonstrate increased ambulation tolerance to >= 5 blocks without seated rest in 6 weeks. PROGRESSING  Pt will: demonstrate improved balance: RODRIGUEZ >= 53/56 in 6 weeks. MET      Plan / Patient Education:     Discharge to [facility].    Subjective:     No falls.  Overall, feeling is a lot more steady. Able to walk a much straighter path at home and out in the community. She notes she is able to walk further and more upright compared to before. No longer needing to stop and take as many rests. Notes shortness of breath with walking to be much better.  She is unsure exactly how far she can walk, but would guess at least 3-4 blocks at this point, which is improved from before.    Objective:     From 19 visit:  TU.5 sec without AD  APTA: 13x without UE A  4m walk test: 2.8 sec without AD  LEFS: 64/80 - significantly improved from 28/80 on IE.  RODRIGUEZ/56 - improved from 50/56 on IE.    Balance Assessment:  Rhomberg - Eyes Open:  30 seconds  Rhomberg - Eyes Closed:  30 seconds  Rhomberg - Perturbed Surface with Eyes Open:  30 seconds  Rhomberg - Perturbed Surface with Eyes Closed:  30 seconds  Sharpened Rhomberg - Eyes Open:  R in front 30 seconds; L in front 30 seconds  Single Leg  Stance:  10 seconds on R LE, up to 7 sec on L LE    Treatment Today     TREATMENT MINUTES COMMENTS   Evaluation     Self-care/ Home management     Manual therapy     Neuromuscular Re-education 24 Balance ex per flow sheet   Therapeutic Activity     Therapeutic Exercises     Gait training     Modality__________________     Physical Performance Testing           Total 24    Blank areas are intentional and mean the treatment did not include these items.       Yariel Mello, PT, DPT  8/13/2019     [facility] Discharge Summary  Patient Name: Gilma Zhong  Date: 8/13/2019  Referral Diagnosis: Balance problem (12 visits max)  Referring provider: Lily Thomas PA-C  Visit Diagnosis:   1. Unsteadiness on feet         Goals:  Pt. will be independent with home exercise program in : 6 weeks. MET  Patient will increase : LEFS score;for improved quality of function;in 6 weeks;by _ points  by ___ points: >= 9. MET  Pt will: demonstrate increased ambulation tolerance to >= 5 blocks without seated rest in 6 weeks. PROGRESSING  Pt will: demonstrate improved balance: RODRIGUEZ >= 53/56 in 6 weeks. MET    Patient was seen for 12 visits from 7/3/19 to 8/13/19 with 0 missed appointments.  The patient met goals and has demonstrated understanding of and independence in the home program for self-care, and progression to next steps.  She will initiate contact if questions or concerns arise.  No further therapy is required at this time.    Therapy will be discontinued at this time.  The patient will need a new referral to resume.    Thank you for your referral.  Yariel Mello  8/13/2019  9:21 AM

## 2021-05-31 NOTE — PROGRESS NOTES
[facility] Daily Progress     Patient Name: Gilma Zhong  Date: 2019  Visit #:  through 10/1  PTA visit #:    PRECAUTIONS: none  Referral Diagnosis: Balance problem (12 visits max)  Referring provider: Lily Thomas PA-C  Visit Diagnosis:     ICD-10-CM    1. Unsteadiness on feet R26.81          Assessment:     Although patient demonstrates relatively low risk for falls, she is appropriate to continue with skilled PT to reduce visual dependence/improve LE proprioception to help further reduce falls risk and improve ambulation tolerance.  HEP/POC compliance is  good .  Patient is benefitting from skilled physical therapy and is making steady progress toward functional goals.  Patient is appropriate to continue with skilled physical therapy intervention, as indicated by initial plan of care.   Goals NEARING.    Goal Status:  Pt. will be independent with home exercise program in : 6 weeks. MET  Patient will increase : LEFS score;for improved quality of function;in 6 weeks;by _ points  by ___ points: >= 9. MET  Pt will: demonstrate increased ambulation tolerance to >= 5 blocks without seated rest in 6 weeks. PROGRESSING  Pt will: demonstrate improved balance: RODRIGUEZ >= 53/56 in 6 weeks. MET      Plan / Patient Education:     Continue per POC, progressing LE strength and static/dynamic standing balance.    Subjective:     No falls.  Tested her walking, and notes she can walk much further before having to stop and rest. Was able to mow her entire lawn without any rest breaks or feeling of fatigue.    Objective:     Pt able to demonstrate SLS upwards of 3 sec on L LE, 5 sec on R LE today.     From  visit:  LEFS: 64/80 - significantly improved from 28/80 on IE.  RODRIGUEZ/56 - improved from 50/56 on IE.    Treatment Today     TREATMENT MINUTES COMMENTS   Evaluation     Self-care/ Home management     Manual therapy     Neuromuscular Re-education 40 Balance ex per flow sheet   Therapeutic Activity      Therapeutic Exercises     Gait training     Modality__________________                Total 40    Blank areas are intentional and mean the treatment did not include these items.       Yariel Mello, PT, DPT  8/1/2019

## 2021-06-01 ENCOUNTER — RECORDS - HEALTHEAST (OUTPATIENT)
Dept: ADMINISTRATIVE | Facility: CLINIC | Age: 81
End: 2021-06-01

## 2021-06-17 NOTE — PATIENT INSTRUCTIONS - HE
Patient Instructions by Yariel Mello PT at 7/3/2019  9:00 AM     Author: Yariel Mello PT Service: -- Author Type: Physical Therapist    Filed: 7/3/2019  9:42 AM Encounter Date: 7/3/2019 Status: Signed    : Yariel Mello PT (Physical Therapist)        STANDING MARCHING    While standing, draw up your knee, set it down and then alternate to your other side.    Use your arms for support if needed for balance and safety.     15x each leg.  2x/day.    HIP ABDUCTION - STANDING     While standing, raise your leg out to the side. Keep your knee straight and maintain your toes pointed forward the entire time.      Use your arms for support if needed for balance and safety.     15x each leg.  2x/day.    HIP EXTENSION - STANDING    While standing, balance on one leg and move your other leg in a backward direction. Do not swing the leg. Perform smooth and controlled movements.     Keep your trunk stable and without arching during the movement.     Use your arms for support if needed for balance and safety.     15x each leg.  2x/day.    STANDING HAMSTRING CURLS    While standing, bend your knee so that your heel moves towards your buttock. Lower back down until first contact with floor and repeat.   Keep knees in-line with one another.    15x each leg.  2x/day.      STANDING HEEL RAISES    While standing, raise up on your toes as you lift your heels off the ground,  Then go back on your heels and lift your toes off the ground.    15x each leg.  2x/day.        SIT TO STAND    While making sure you bend at the hip, SLOWLY sit down    Your chest may come forward over your toes, but your spine should stay in neutral as shown.    Make sure to squeeze your butt at the top.    15 reps.  2x/day.            Static Standing Balance    Practice holding each position to improve your balance.    1. Feet together.    2. One foot slightly in front (semi-tandem).    3. One foot completely in front (tandem).       Hold 30  seconds. 2x each foot. 2x/day.

## 2021-07-13 ENCOUNTER — RECORDS - HEALTHEAST (OUTPATIENT)
Dept: ADMINISTRATIVE | Facility: CLINIC | Age: 81
End: 2021-07-13

## 2021-07-21 ENCOUNTER — RECORDS - HEALTHEAST (OUTPATIENT)
Dept: ADMINISTRATIVE | Facility: CLINIC | Age: 81
End: 2021-07-21

## 2021-09-22 ENCOUNTER — LAB REQUISITION (OUTPATIENT)
Dept: LAB | Facility: CLINIC | Age: 81
End: 2021-09-22

## 2021-09-22 DIAGNOSIS — E78.5 HYPERLIPIDEMIA, UNSPECIFIED: ICD-10-CM

## 2021-09-22 DIAGNOSIS — I10 ESSENTIAL (PRIMARY) HYPERTENSION: ICD-10-CM

## 2021-09-22 LAB
ANION GAP SERPL CALCULATED.3IONS-SCNC: 11 MMOL/L (ref 5–18)
BUN SERPL-MCNC: 20 MG/DL (ref 8–28)
CALCIUM SERPL-MCNC: 9.5 MG/DL (ref 8.5–10.5)
CHLORIDE BLD-SCNC: 102 MMOL/L (ref 98–107)
CHOLEST SERPL-MCNC: 161 MG/DL
CO2 SERPL-SCNC: 26 MMOL/L (ref 22–31)
CREAT SERPL-MCNC: 1.08 MG/DL (ref 0.6–1.1)
GFR SERPL CREATININE-BSD FRML MDRD: 48 ML/MIN/1.73M2
GLUCOSE BLD-MCNC: 102 MG/DL (ref 70–125)
HDLC SERPL-MCNC: 41 MG/DL
LDLC SERPL CALC-MCNC: 74 MG/DL
POTASSIUM BLD-SCNC: 4.2 MMOL/L (ref 3.5–5)
SODIUM SERPL-SCNC: 139 MMOL/L (ref 136–145)
TRIGL SERPL-MCNC: 228 MG/DL

## 2021-09-22 PROCEDURE — 36415 COLL VENOUS BLD VENIPUNCTURE: CPT | Performed by: PHYSICIAN ASSISTANT

## 2021-09-22 PROCEDURE — 80048 BASIC METABOLIC PNL TOTAL CA: CPT | Performed by: PHYSICIAN ASSISTANT

## 2021-09-22 PROCEDURE — 80061 LIPID PANEL: CPT | Performed by: PHYSICIAN ASSISTANT

## 2021-10-07 ENCOUNTER — ANCILLARY PROCEDURE (OUTPATIENT)
Dept: BONE DENSITY | Facility: CLINIC | Age: 81
End: 2021-10-07
Attending: PHYSICIAN ASSISTANT
Payer: COMMERCIAL

## 2021-10-07 DIAGNOSIS — Z78.0 POST-MENOPAUSAL: ICD-10-CM

## 2021-10-07 DIAGNOSIS — M85.89 OSTEOPENIA OF MULTIPLE SITES: ICD-10-CM

## 2021-10-07 PROCEDURE — 77081 DXA BONE DENSITY APPENDICULR: CPT | Mod: 59 | Performed by: INTERNAL MEDICINE

## 2021-10-07 PROCEDURE — 77080 DXA BONE DENSITY AXIAL: CPT | Performed by: INTERNAL MEDICINE

## 2021-10-26 ENCOUNTER — LAB REQUISITION (OUTPATIENT)
Dept: LAB | Facility: CLINIC | Age: 81
End: 2021-10-26

## 2021-10-26 DIAGNOSIS — M85.89 OTHER SPECIFIED DISORDERS OF BONE DENSITY AND STRUCTURE, MULTIPLE SITES: ICD-10-CM

## 2021-10-26 LAB — PTH-INTACT SERPL-MCNC: 31 PG/ML (ref 10–86)

## 2021-10-26 PROCEDURE — 83970 ASSAY OF PARATHORMONE: CPT | Performed by: PHYSICIAN ASSISTANT

## 2022-09-27 ENCOUNTER — LAB REQUISITION (OUTPATIENT)
Dept: LAB | Facility: CLINIC | Age: 82
End: 2022-09-27

## 2022-09-27 DIAGNOSIS — E78.1 PURE HYPERGLYCERIDEMIA: ICD-10-CM

## 2022-09-27 DIAGNOSIS — I10 ESSENTIAL (PRIMARY) HYPERTENSION: ICD-10-CM

## 2022-09-27 LAB
ANION GAP SERPL CALCULATED.3IONS-SCNC: 13 MMOL/L (ref 7–15)
BUN SERPL-MCNC: 36.2 MG/DL (ref 8–23)
CALCIUM SERPL-MCNC: 8.8 MG/DL (ref 8.8–10.2)
CHLORIDE SERPL-SCNC: 101 MMOL/L (ref 98–107)
CHOLEST SERPL-MCNC: 153 MG/DL
CREAT SERPL-MCNC: 1.45 MG/DL (ref 0.51–0.95)
DEPRECATED HCO3 PLAS-SCNC: 21 MMOL/L (ref 22–29)
GFR SERPL CREATININE-BSD FRML MDRD: 36 ML/MIN/1.73M2
GLUCOSE SERPL-MCNC: 103 MG/DL (ref 70–99)
HDLC SERPL-MCNC: 36 MG/DL
LDLC SERPL CALC-MCNC: 74 MG/DL
NONHDLC SERPL-MCNC: 117 MG/DL
POTASSIUM SERPL-SCNC: 4.3 MMOL/L (ref 3.4–5.3)
SODIUM SERPL-SCNC: 135 MMOL/L (ref 136–145)
TRIGL SERPL-MCNC: 217 MG/DL

## 2022-09-27 PROCEDURE — 82310 ASSAY OF CALCIUM: CPT | Performed by: PHYSICIAN ASSISTANT

## 2022-09-27 PROCEDURE — 80061 LIPID PANEL: CPT | Performed by: PHYSICIAN ASSISTANT

## 2023-09-28 ENCOUNTER — LAB REQUISITION (OUTPATIENT)
Dept: LAB | Facility: CLINIC | Age: 83
End: 2023-09-28

## 2023-09-28 DIAGNOSIS — E78.1 PURE HYPERGLYCERIDEMIA: ICD-10-CM

## 2023-09-28 DIAGNOSIS — I10 ESSENTIAL (PRIMARY) HYPERTENSION: ICD-10-CM

## 2023-09-28 LAB
ANION GAP SERPL CALCULATED.3IONS-SCNC: 17 MMOL/L (ref 7–15)
BUN SERPL-MCNC: 35 MG/DL (ref 8–23)
CALCIUM SERPL-MCNC: 8.9 MG/DL (ref 8.8–10.2)
CHLORIDE SERPL-SCNC: 103 MMOL/L (ref 98–107)
CHOLEST SERPL-MCNC: 153 MG/DL
CREAT SERPL-MCNC: 1.52 MG/DL (ref 0.51–0.95)
EGFRCR SERPLBLD CKD-EPI 2021: 33 ML/MIN/1.73M2
GLUCOSE SERPL-MCNC: 97 MG/DL (ref 70–99)
HCO3 SERPL-SCNC: 18 MMOL/L (ref 22–29)
HDLC SERPL-MCNC: 35 MG/DL
LDLC SERPL CALC-MCNC: 76 MG/DL
NONHDLC SERPL-MCNC: 118 MG/DL
POTASSIUM SERPL-SCNC: 4.1 MMOL/L (ref 3.4–5.3)
SODIUM SERPL-SCNC: 138 MMOL/L (ref 135–145)
TRIGL SERPL-MCNC: 208 MG/DL

## 2023-09-28 PROCEDURE — 80061 LIPID PANEL: CPT | Performed by: PHYSICIAN ASSISTANT

## 2023-09-28 PROCEDURE — 80048 BASIC METABOLIC PNL TOTAL CA: CPT | Performed by: PHYSICIAN ASSISTANT

## 2023-10-19 ENCOUNTER — LAB REQUISITION (OUTPATIENT)
Dept: LAB | Facility: CLINIC | Age: 83
End: 2023-10-19

## 2023-10-19 DIAGNOSIS — R06.09 OTHER FORMS OF DYSPNEA: ICD-10-CM

## 2023-10-19 DIAGNOSIS — N18.32 CHRONIC KIDNEY DISEASE, STAGE 3B (H): ICD-10-CM

## 2023-10-19 LAB
ANION GAP SERPL CALCULATED.3IONS-SCNC: 11 MMOL/L (ref 7–15)
BUN SERPL-MCNC: 25.2 MG/DL (ref 8–23)
CALCIUM SERPL-MCNC: 8.6 MG/DL (ref 8.8–10.2)
CHLORIDE SERPL-SCNC: 106 MMOL/L (ref 98–107)
CREAT SERPL-MCNC: 1.07 MG/DL (ref 0.51–0.95)
DEPRECATED HCO3 PLAS-SCNC: 22 MMOL/L (ref 22–29)
EGFRCR SERPLBLD CKD-EPI 2021: 51 ML/MIN/1.73M2
GLUCOSE SERPL-MCNC: 102 MG/DL (ref 70–99)
NT-PROBNP SERPL-MCNC: 4161 PG/ML (ref 0–1800)
POTASSIUM SERPL-SCNC: 3.7 MMOL/L (ref 3.4–5.3)
SODIUM SERPL-SCNC: 139 MMOL/L (ref 135–145)

## 2023-10-19 PROCEDURE — 80048 BASIC METABOLIC PNL TOTAL CA: CPT | Performed by: PHYSICIAN ASSISTANT

## 2023-10-19 PROCEDURE — 83880 ASSAY OF NATRIURETIC PEPTIDE: CPT | Performed by: PHYSICIAN ASSISTANT

## 2023-12-14 ENCOUNTER — LAB REQUISITION (OUTPATIENT)
Dept: LAB | Facility: CLINIC | Age: 83
End: 2023-12-14

## 2023-12-14 DIAGNOSIS — I10 ESSENTIAL (PRIMARY) HYPERTENSION: ICD-10-CM

## 2023-12-14 PROCEDURE — 80048 BASIC METABOLIC PNL TOTAL CA: CPT | Performed by: PHYSICIAN ASSISTANT

## 2023-12-15 LAB
ANION GAP SERPL CALCULATED.3IONS-SCNC: 14 MMOL/L (ref 7–15)
BUN SERPL-MCNC: 25.2 MG/DL (ref 8–23)
CALCIUM SERPL-MCNC: 9 MG/DL (ref 8.8–10.2)
CHLORIDE SERPL-SCNC: 101 MMOL/L (ref 98–107)
CREAT SERPL-MCNC: 1.17 MG/DL (ref 0.51–0.95)
DEPRECATED HCO3 PLAS-SCNC: 24 MMOL/L (ref 22–29)
EGFRCR SERPLBLD CKD-EPI 2021: 46 ML/MIN/1.73M2
GLUCOSE SERPL-MCNC: 96 MG/DL (ref 70–99)
POTASSIUM SERPL-SCNC: 3.7 MMOL/L (ref 3.4–5.3)
SODIUM SERPL-SCNC: 139 MMOL/L (ref 135–145)

## 2024-08-06 ENCOUNTER — LAB REQUISITION (OUTPATIENT)
Dept: LAB | Facility: CLINIC | Age: 84
End: 2024-08-06

## 2024-08-06 DIAGNOSIS — I12.9 HYPERTENSIVE CHRONIC KIDNEY DISEASE WITH STAGE 1 THROUGH STAGE 4 CHRONIC KIDNEY DISEASE, OR UNSPECIFIED CHRONIC KIDNEY DISEASE: ICD-10-CM

## 2024-08-06 PROCEDURE — 84443 ASSAY THYROID STIM HORMONE: CPT | Performed by: PHYSICIAN ASSISTANT

## 2024-08-06 PROCEDURE — 80048 BASIC METABOLIC PNL TOTAL CA: CPT | Performed by: PHYSICIAN ASSISTANT

## 2024-08-06 PROCEDURE — 80061 LIPID PANEL: CPT | Performed by: PHYSICIAN ASSISTANT

## 2024-08-07 LAB
ANION GAP SERPL CALCULATED.3IONS-SCNC: 14 MMOL/L (ref 7–15)
BUN SERPL-MCNC: 32.4 MG/DL (ref 8–23)
CALCIUM SERPL-MCNC: 8.6 MG/DL (ref 8.8–10.4)
CHLORIDE SERPL-SCNC: 102 MMOL/L (ref 98–107)
CHOLEST SERPL-MCNC: 156 MG/DL
CREAT SERPL-MCNC: 1.4 MG/DL (ref 0.51–0.95)
EGFRCR SERPLBLD CKD-EPI 2021: 37 ML/MIN/1.73M2
FASTING STATUS PATIENT QL REPORTED: ABNORMAL
FASTING STATUS PATIENT QL REPORTED: ABNORMAL
GLUCOSE SERPL-MCNC: 123 MG/DL (ref 70–99)
HCO3 SERPL-SCNC: 24 MMOL/L (ref 22–29)
HDLC SERPL-MCNC: 35 MG/DL
LDLC SERPL CALC-MCNC: 65 MG/DL
NONHDLC SERPL-MCNC: 121 MG/DL
POTASSIUM SERPL-SCNC: 2.9 MMOL/L (ref 3.4–5.3)
SODIUM SERPL-SCNC: 140 MMOL/L (ref 135–145)
TRIGL SERPL-MCNC: 280 MG/DL
TSH SERPL DL<=0.005 MIU/L-ACNC: 1.46 UIU/ML (ref 0.3–4.2)

## 2024-08-15 ENCOUNTER — LAB REQUISITION (OUTPATIENT)
Dept: LAB | Facility: CLINIC | Age: 84
End: 2024-08-15

## 2024-08-15 DIAGNOSIS — I10 ESSENTIAL (PRIMARY) HYPERTENSION: ICD-10-CM

## 2024-08-15 LAB
ANION GAP SERPL CALCULATED.3IONS-SCNC: 13 MMOL/L (ref 7–15)
BUN SERPL-MCNC: 22.2 MG/DL (ref 8–23)
CALCIUM SERPL-MCNC: 9.4 MG/DL (ref 8.8–10.4)
CHLORIDE SERPL-SCNC: 101 MMOL/L (ref 98–107)
CREAT SERPL-MCNC: 1.12 MG/DL (ref 0.51–0.95)
EGFRCR SERPLBLD CKD-EPI 2021: 48 ML/MIN/1.73M2
GLUCOSE SERPL-MCNC: 94 MG/DL (ref 70–99)
HCO3 SERPL-SCNC: 23 MMOL/L (ref 22–29)
POTASSIUM SERPL-SCNC: 3.6 MMOL/L (ref 3.4–5.3)
SODIUM SERPL-SCNC: 137 MMOL/L (ref 135–145)

## 2024-08-15 PROCEDURE — 80048 BASIC METABOLIC PNL TOTAL CA: CPT | Performed by: PHYSICIAN ASSISTANT

## 2024-10-18 ENCOUNTER — LAB REQUISITION (OUTPATIENT)
Dept: LAB | Facility: CLINIC | Age: 84
End: 2024-10-18
Payer: COMMERCIAL

## 2024-10-18 DIAGNOSIS — E87.6 HYPOKALEMIA: ICD-10-CM

## 2024-10-21 LAB — POTASSIUM SERPL-SCNC: 3.6 MMOL/L (ref 3.4–5.3)

## 2024-10-21 PROCEDURE — P9604 ONE-WAY ALLOW PRORATED TRIP: HCPCS | Mod: ORL | Performed by: FAMILY MEDICINE

## 2024-10-21 PROCEDURE — 36415 COLL VENOUS BLD VENIPUNCTURE: CPT | Mod: ORL | Performed by: FAMILY MEDICINE

## 2024-10-21 PROCEDURE — 84132 ASSAY OF SERUM POTASSIUM: CPT | Mod: ORL | Performed by: FAMILY MEDICINE

## 2024-10-23 ENCOUNTER — LAB REQUISITION (OUTPATIENT)
Dept: LAB | Facility: CLINIC | Age: 84
End: 2024-10-23
Payer: COMMERCIAL

## 2024-10-23 DIAGNOSIS — D64.9 ANEMIA, UNSPECIFIED: ICD-10-CM

## 2024-10-28 LAB
ANION GAP SERPL CALCULATED.3IONS-SCNC: 11 MMOL/L (ref 7–15)
BUN SERPL-MCNC: 16 MG/DL (ref 8–23)
CALCIUM SERPL-MCNC: 8.2 MG/DL (ref 8.8–10.4)
CHLORIDE SERPL-SCNC: 106 MMOL/L (ref 98–107)
CREAT SERPL-MCNC: 0.87 MG/DL (ref 0.51–0.95)
EGFRCR SERPLBLD CKD-EPI 2021: 65 ML/MIN/1.73M2
ERYTHROCYTE [DISTWIDTH] IN BLOOD BY AUTOMATED COUNT: 14 % (ref 10–15)
GLUCOSE SERPL-MCNC: 87 MG/DL (ref 70–99)
HCO3 SERPL-SCNC: 25 MMOL/L (ref 22–29)
HCT VFR BLD AUTO: 32.8 % (ref 35–47)
HGB BLD-MCNC: 10.1 G/DL (ref 11.7–15.7)
MCH RBC QN AUTO: 26.7 PG (ref 26.5–33)
MCHC RBC AUTO-ENTMCNC: 30.8 G/DL (ref 31.5–36.5)
MCV RBC AUTO: 87 FL (ref 78–100)
PLATELET # BLD AUTO: 302 10E3/UL (ref 150–450)
POTASSIUM SERPL-SCNC: 3.5 MMOL/L (ref 3.4–5.3)
RBC # BLD AUTO: 3.78 10E6/UL (ref 3.8–5.2)
SODIUM SERPL-SCNC: 142 MMOL/L (ref 135–145)
WBC # BLD AUTO: 9.4 10E3/UL (ref 4–11)

## 2024-10-28 PROCEDURE — P9604 ONE-WAY ALLOW PRORATED TRIP: HCPCS | Mod: ORL | Performed by: FAMILY MEDICINE

## 2024-10-28 PROCEDURE — 36415 COLL VENOUS BLD VENIPUNCTURE: CPT | Mod: ORL | Performed by: FAMILY MEDICINE

## 2024-10-28 PROCEDURE — 85027 COMPLETE CBC AUTOMATED: CPT | Mod: ORL | Performed by: FAMILY MEDICINE

## 2024-10-28 PROCEDURE — 80048 BASIC METABOLIC PNL TOTAL CA: CPT | Mod: ORL | Performed by: FAMILY MEDICINE

## 2024-11-05 ENCOUNTER — LAB REQUISITION (OUTPATIENT)
Dept: LAB | Facility: CLINIC | Age: 84
End: 2024-11-05
Payer: COMMERCIAL

## 2024-11-05 DIAGNOSIS — D72.829 ELEVATED WHITE BLOOD CELL COUNT, UNSPECIFIED: ICD-10-CM

## 2024-11-06 LAB
ERYTHROCYTE [DISTWIDTH] IN BLOOD BY AUTOMATED COUNT: 14.1 % (ref 10–15)
HCT VFR BLD AUTO: 31.4 % (ref 35–47)
HGB BLD-MCNC: 9.8 G/DL (ref 11.7–15.7)
MCH RBC QN AUTO: 26.8 PG (ref 26.5–33)
MCHC RBC AUTO-ENTMCNC: 31.2 G/DL (ref 31.5–36.5)
MCV RBC AUTO: 86 FL (ref 78–100)
PLATELET # BLD AUTO: 320 10E3/UL (ref 150–450)
RBC # BLD AUTO: 3.65 10E6/UL (ref 3.8–5.2)
WBC # BLD AUTO: 10.7 10E3/UL (ref 4–11)

## 2024-11-06 PROCEDURE — 85027 COMPLETE CBC AUTOMATED: CPT | Mod: ORL | Performed by: FAMILY MEDICINE

## 2024-11-06 PROCEDURE — 36415 COLL VENOUS BLD VENIPUNCTURE: CPT | Mod: ORL | Performed by: FAMILY MEDICINE

## 2024-11-06 PROCEDURE — P9604 ONE-WAY ALLOW PRORATED TRIP: HCPCS | Mod: ORL | Performed by: FAMILY MEDICINE

## 2024-12-19 ENCOUNTER — LAB REQUISITION (OUTPATIENT)
Dept: LAB | Facility: CLINIC | Age: 84
End: 2024-12-19

## 2024-12-19 DIAGNOSIS — I12.9 HYPERTENSIVE CHRONIC KIDNEY DISEASE WITH STAGE 1 THROUGH STAGE 4 CHRONIC KIDNEY DISEASE, OR UNSPECIFIED CHRONIC KIDNEY DISEASE: ICD-10-CM

## 2024-12-19 DIAGNOSIS — E53.8 DEFICIENCY OF OTHER SPECIFIED B GROUP VITAMINS: ICD-10-CM

## 2024-12-19 LAB
ALBUMIN SERPL BCG-MCNC: 3.7 G/DL (ref 3.5–5.2)
ALP SERPL-CCNC: 103 U/L (ref 40–150)
ALT SERPL W P-5'-P-CCNC: 13 U/L (ref 0–50)
ANION GAP SERPL CALCULATED.3IONS-SCNC: 10 MMOL/L (ref 7–15)
AST SERPL W P-5'-P-CCNC: 17 U/L (ref 0–45)
BILIRUB SERPL-MCNC: 0.3 MG/DL
BUN SERPL-MCNC: 20.3 MG/DL (ref 8–23)
CALCIUM SERPL-MCNC: 8.8 MG/DL (ref 8.8–10.4)
CHLORIDE SERPL-SCNC: 105 MMOL/L (ref 98–107)
CREAT SERPL-MCNC: 0.99 MG/DL (ref 0.51–0.95)
EGFRCR SERPLBLD CKD-EPI 2021: 56 ML/MIN/1.73M2
GLUCOSE SERPL-MCNC: 94 MG/DL (ref 70–99)
HCO3 SERPL-SCNC: 23 MMOL/L (ref 22–29)
POTASSIUM SERPL-SCNC: 3.8 MMOL/L (ref 3.4–5.3)
PROT SERPL-MCNC: 7.6 G/DL (ref 6.4–8.3)
SODIUM SERPL-SCNC: 138 MMOL/L (ref 135–145)
VIT B12 SERPL-MCNC: 349 PG/ML (ref 232–1245)

## 2024-12-19 PROCEDURE — 84460 ALANINE AMINO (ALT) (SGPT): CPT | Performed by: PHYSICIAN ASSISTANT

## 2024-12-19 PROCEDURE — 82607 VITAMIN B-12: CPT | Performed by: PHYSICIAN ASSISTANT

## 2024-12-19 PROCEDURE — 84155 ASSAY OF PROTEIN SERUM: CPT | Performed by: PHYSICIAN ASSISTANT
